# Patient Record
Sex: FEMALE | Race: WHITE | NOT HISPANIC OR LATINO
[De-identification: names, ages, dates, MRNs, and addresses within clinical notes are randomized per-mention and may not be internally consistent; named-entity substitution may affect disease eponyms.]

---

## 2017-01-04 ENCOUNTER — APPOINTMENT (OUTPATIENT)
Dept: OBGYN | Facility: CLINIC | Age: 67
End: 2017-01-04

## 2017-11-20 ENCOUNTER — APPOINTMENT (OUTPATIENT)
Dept: OBGYN | Facility: CLINIC | Age: 67
End: 2017-11-20
Payer: MEDICARE

## 2017-11-20 VITALS
BODY MASS INDEX: 35.68 KG/M2 | DIASTOLIC BLOOD PRESSURE: 82 MMHG | WEIGHT: 189 LBS | HEIGHT: 61 IN | SYSTOLIC BLOOD PRESSURE: 161 MMHG

## 2017-11-20 DIAGNOSIS — Z87.42 PERSONAL HISTORY OF OTHER DISEASES OF THE FEMALE GENITAL TRACT: ICD-10-CM

## 2017-11-20 DIAGNOSIS — E78.00 PURE HYPERCHOLESTEROLEMIA, UNSPECIFIED: ICD-10-CM

## 2017-11-20 DIAGNOSIS — Z09 ENCOUNTER FOR FOLLOW-UP EXAMINATION AFTER COMPLETED TREATMENT FOR CONDITIONS OTHER THAN MALIGNANT NEOPLASM: ICD-10-CM

## 2017-11-20 PROCEDURE — G0101: CPT

## 2017-11-20 RX ORDER — CALCIUM CARBONATE 600 MG
TABLET ORAL
Refills: 0 | Status: ACTIVE | COMMUNITY

## 2017-11-27 LAB
CYTOLOGY CVX/VAG DOC THIN PREP: NORMAL
HPV HIGH+LOW RISK DNA PNL CVX: NOT DETECTED

## 2020-03-05 ENCOUNTER — APPOINTMENT (OUTPATIENT)
Dept: OBGYN | Facility: CLINIC | Age: 70
End: 2020-03-05
Payer: MEDICARE

## 2020-03-05 VITALS
BODY MASS INDEX: 36.82 KG/M2 | DIASTOLIC BLOOD PRESSURE: 97 MMHG | SYSTOLIC BLOOD PRESSURE: 187 MMHG | HEIGHT: 61 IN | WEIGHT: 195 LBS

## 2020-03-05 DIAGNOSIS — E03.9 HYPOTHYROIDISM, UNSPECIFIED: ICD-10-CM

## 2020-03-05 DIAGNOSIS — Z01.419 ENCOUNTER FOR GYNECOLOGICAL EXAMINATION (GENERAL) (ROUTINE) W/OUT ABNORMAL FINDINGS: ICD-10-CM

## 2020-03-05 PROCEDURE — G0101: CPT | Mod: GA

## 2020-03-05 RX ORDER — ROSUVASTATIN CALCIUM 5 MG/1
TABLET, FILM COATED ORAL
Refills: 0 | Status: ACTIVE | COMMUNITY

## 2020-03-05 RX ORDER — METOPROLOL TARTRATE 50 MG/1
50 TABLET, FILM COATED ORAL
Refills: 0 | Status: ACTIVE | COMMUNITY

## 2020-03-05 NOTE — PHYSICAL EXAM
[Awake] : awake [Alert] : alert [Soft] : soft [Oriented x3] : oriented to person, place, and time [Normal] : uterus [No Bleeding] : there was no active vaginal bleeding [Uterine Adnexae] : were not tender and not enlarged [Acute Distress] : no acute distress [Mass] : no breast mass [Nipple Discharge] : no nipple discharge [Axillary LAD] : no axillary lymphadenopathy [Tender] : non tender [FreeTextEntry7] : bulky uterus

## 2020-03-05 NOTE — CHIEF COMPLAINT
[Annual Visit] : annual visit [FreeTextEntry1] : Pt states cardiology put her back on blood thinners - had a spot of blood and so she stopped the blood thinners.  Hasnt yet told cardiologist.  No other complaints.

## 2020-03-09 LAB — HPV HIGH+LOW RISK DNA PNL CVX: NOT DETECTED

## 2020-03-10 ENCOUNTER — APPOINTMENT (OUTPATIENT)
Dept: OBGYN | Facility: CLINIC | Age: 70
End: 2020-03-10

## 2020-03-12 LAB — CYTOLOGY CVX/VAG DOC THIN PREP: ABNORMAL

## 2022-08-16 ENCOUNTER — APPOINTMENT (OUTPATIENT)
Dept: ORTHOPEDIC SURGERY | Facility: CLINIC | Age: 72
End: 2022-08-16

## 2022-08-16 VITALS — BODY MASS INDEX: 36.82 KG/M2 | WEIGHT: 195 LBS | HEIGHT: 61 IN

## 2022-08-16 DIAGNOSIS — M16.12 UNILATERAL PRIMARY OSTEOARTHRITIS, LEFT HIP: ICD-10-CM

## 2022-08-16 DIAGNOSIS — I48.91 UNSPECIFIED ATRIAL FIBRILLATION: ICD-10-CM

## 2022-08-16 PROCEDURE — 72170 X-RAY EXAM OF PELVIS: CPT

## 2022-08-16 PROCEDURE — 72100 X-RAY EXAM L-S SPINE 2/3 VWS: CPT

## 2022-08-16 PROCEDURE — 99203 OFFICE O/P NEW LOW 30 MIN: CPT

## 2022-08-16 PROCEDURE — 99213 OFFICE O/P EST LOW 20 MIN: CPT

## 2022-08-16 RX ORDER — METHYLPREDNISOLONE 4 MG/1
4 TABLET ORAL
Qty: 1 | Refills: 0 | Status: ACTIVE | COMMUNITY
Start: 2022-08-16 | End: 1900-01-01

## 2022-08-16 NOTE — PHYSICAL EXAM
[Flexion] : flexion [Extension] : extension [Bending to left] : bending to left [Bending to right] : bending to right [Left] : left hip [5___] : adduction 5[unfilled]/5 [Facet arthropathy] : Facet arthropathy [Disc space narrowing] : Disc space narrowing [AP] : anteroposterior [There are no fractures, subluxations or dislocations. No significant abnormalities are seen] : There are no fractures, subluxations or dislocations. No significant abnormalities are seen [FreeTextEntry1] : large uterine fibroid calcified [] : external rotation not limited

## 2022-08-16 NOTE — HISTORY OF PRESENT ILLNESS
[Left Leg] : left leg [3] : 3 [Dull/Aching] : dull/aching [Intermittent] : intermittent [Nothing helps with pain getting better] : Nothing helps with pain getting better [Walking] : walking [de-identified] : 8-16-22- She states 2 month h/o pain left buttock and into the left groin and mid way down left thigh. denies injury. Pain is worse with ambulation and prolonged standing.\par some help with apap\par PMH afib can not take nsaids [] : no [FreeTextEntry1] : LT Hip [FreeTextEntry5] : denies traumatic injury.

## 2022-09-13 ENCOUNTER — APPOINTMENT (OUTPATIENT)
Dept: ORTHOPEDIC SURGERY | Facility: CLINIC | Age: 72
End: 2022-09-13

## 2022-10-27 ENCOUNTER — APPOINTMENT (OUTPATIENT)
Dept: ORTHOPEDIC SURGERY | Facility: CLINIC | Age: 72
End: 2022-10-27

## 2022-10-27 VITALS — HEIGHT: 61 IN | BODY MASS INDEX: 36.82 KG/M2 | WEIGHT: 195 LBS

## 2022-10-27 PROCEDURE — 99213 OFFICE O/P EST LOW 20 MIN: CPT

## 2022-10-27 NOTE — PHYSICAL EXAM
[Flexion] : flexion [Extension] : extension [Bending to left] : bending to left [Bending to right] : bending to right [Facet arthropathy] : Facet arthropathy [Disc space narrowing] : Disc space narrowing [AP] : anteroposterior [There are no fractures, subluxations or dislocations. No significant abnormalities are seen] : There are no fractures, subluxations or dislocations. No significant abnormalities are seen [Left] : left hip [5___] : adduction 5[unfilled]/5 [FreeTextEntry1] : large uterine fibroid calcified [] : external rotation not limited

## 2022-10-27 NOTE — HISTORY OF PRESENT ILLNESS
[Left Leg] : left leg [Gradual] : gradual [Dull/Aching] : dull/aching [Radiating] : radiating [Tingling] : tingling [Sleep] : sleep [Meds] : meds [Standing] : standing [Walking] : walking [Stairs] : stairs [3] : 3 [Intermittent] : intermittent [Nothing helps with pain getting better] : Nothing helps with pain getting better [de-identified] : 8-16-22- She states 2 month h/o pain left buttock and into the left groin and mid way down left thigh. denies injury. Pain is worse with ambulation and prolonged standing.\par some help with apap\par PMH afib can not take nsaids [] : no [FreeTextEntry1] : LT Hip [FreeTextEntry4] : 4 months  [FreeTextEntry5] : denies traumatic injury. [FreeTextEntry7] : hips to the feet  [FreeTextEntry9] : Prednisone

## 2022-10-31 ENCOUNTER — FORM ENCOUNTER (OUTPATIENT)
Age: 72
End: 2022-10-31

## 2022-11-01 ENCOUNTER — APPOINTMENT (OUTPATIENT)
Dept: MRI IMAGING | Facility: CLINIC | Age: 72
End: 2022-11-01

## 2022-11-01 PROCEDURE — 72148 MRI LUMBAR SPINE W/O DYE: CPT | Mod: MH

## 2022-11-08 ENCOUNTER — APPOINTMENT (OUTPATIENT)
Dept: ORTHOPEDIC SURGERY | Facility: CLINIC | Age: 72
End: 2022-11-08

## 2022-11-08 VITALS — WEIGHT: 195 LBS | HEIGHT: 61 IN | BODY MASS INDEX: 36.82 KG/M2

## 2022-11-08 DIAGNOSIS — M54.16 RADICULOPATHY, LUMBAR REGION: ICD-10-CM

## 2022-11-08 PROCEDURE — 99213 OFFICE O/P EST LOW 20 MIN: CPT

## 2022-11-08 NOTE — DISCUSSION/SUMMARY
[de-identified] : No surgery or LESIs necessary. Will start course of PT and see how she progresses

## 2022-11-08 NOTE — REASON FOR VISIT
[FreeTextEntry2] : 11/8/22- Had MRI, no significant HNP or central stenosis, mild listhesis, foraminal stenosis\par 10/27/22- The MDP helped, didn't do PT, still getting pain left buttock/hip/leg

## 2022-11-08 NOTE — HISTORY OF PRESENT ILLNESS
[Lower back] : lower back [Left Leg] : left leg [Gradual] : gradual [3] : 3 [Dull/Aching] : dull/aching [Radiating] : radiating [Tingling] : tingling [Intermittent] : intermittent [Sleep] : sleep [Meds] : meds [Nothing helps with pain getting better] : Nothing helps with pain getting better [Standing] : standing [Walking] : walking [Stairs] : stairs [de-identified] : 8-16-22- She states 2 month h/o pain left buttock and into the left groin and mid way down left thigh. denies injury. Pain is worse with ambulation and prolonged standing.\par some help with apap\par PMH afib can not take nsaids [] : no [FreeTextEntry1] : LT Hip [FreeTextEntry4] : 4 months  [FreeTextEntry5] : denies traumatic injury. [FreeTextEntry7] : hips to the feet  [FreeTextEntry9] : Prednisone

## 2022-12-20 ENCOUNTER — APPOINTMENT (OUTPATIENT)
Dept: ORTHOPEDIC SURGERY | Facility: CLINIC | Age: 72
End: 2022-12-20